# Patient Record
Sex: MALE | Race: WHITE
[De-identification: names, ages, dates, MRNs, and addresses within clinical notes are randomized per-mention and may not be internally consistent; named-entity substitution may affect disease eponyms.]

---

## 2018-08-13 ENCOUNTER — HOSPITAL ENCOUNTER (EMERGENCY)
Dept: HOSPITAL 50 - VM.ED | Age: 68
Discharge: HOME | End: 2018-08-13
Payer: MEDICARE

## 2018-08-13 VITALS — SYSTOLIC BLOOD PRESSURE: 182 MMHG | DIASTOLIC BLOOD PRESSURE: 94 MMHG

## 2018-08-13 DIAGNOSIS — Z79.82: ICD-10-CM

## 2018-08-13 DIAGNOSIS — S63.284A: ICD-10-CM

## 2018-08-13 DIAGNOSIS — S63.294A: ICD-10-CM

## 2018-08-13 DIAGNOSIS — Z79.899: ICD-10-CM

## 2018-08-13 DIAGNOSIS — Z79.84: ICD-10-CM

## 2018-08-13 DIAGNOSIS — F17.210: ICD-10-CM

## 2018-08-13 DIAGNOSIS — S06.0X9A: Primary | ICD-10-CM

## 2018-08-13 DIAGNOSIS — S01.01XA: ICD-10-CM
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
## 2018-08-14 NOTE — EDM.PDOC
ED HPI GENERAL MEDICAL PROBLEM





- General


Chief Complaint: Head Injury


Stated Complaint: FALL


Time Seen by Provider: 08/13/18 13:20


Source of Information: Reports: Patient


History Limitations: Reports: No Limitations





- History of Present Illness


INITIAL COMMENTS - FREE TEXT/NARRATIVE: 





Pt. presents to ER with complaints of finger pain, facial laceration, and neck 

pain post fall. States that it was a mechanical fall that was caused by 

tripping on a rub. He did strike the R side of his head and possibly had a 

brief LOC. No seizure activity or incontinence. States that he did not have any 

chest pain or shortness of breath prior to or after the fall. No nausea/

vomiting. Denies any blurred vision.


He complaints of pain to the 4th digit of the R hand.


Location: Reports: Head, Neck, Lower Extremity, Right


  ** Right 4-Ring finger


Pain Score (Numeric/FACES): 4





- Related Data


 Allergies











Allergy/AdvReac Type Severity Reaction Status Date / Time


 


No Known Allergies Allergy   Verified 08/13/18 14:48











Home Meds: 


 Home Meds





Allopurinol 150 mg PO DAILY 12/28/13 [History]


Saw Palmetto Fruit [Saw Palmetto] 450 mg PO DAILY 12/30/13 [History]


Albuterol/Ipratropium [Combivent] 1 puff INH QID #1 mdi 12/31/13 [Rx]


Aspirin 1 tab PO DAILY 09/15/15 [History]


Calcium Carbonate/Vitamin D3 [Calcium 600 + Vit D Tablet] 1 tab PO DAILY 09/15/

15 [History]


Clopidogrel [Plavix] 75 mg PO DAILY 09/15/15 [History]


Desoximetasone [Topicort 0.25% Crm] TOP ASDIRECTED PRN 09/15/15 [History]


Fish Oil/Omega-3 Fatty Acids [Fish Oil 1,000 MG] 1 cap PO DAILY 09/15/15 [

History]


Fluticasone/Salmeterol [Advair 250-50] 1 puff PO BID 09/15/15 [History]


Lisinopril 2.5 mg PO DAILY 09/15/15 [History]


Metoprolol Tartrate [Lopressor] 50 mg PO BID 09/15/15 [History]


Multivitamin [Multivitamins] 1 tab PO DAILY 09/15/15 [History]


Nitroglycerin [Nitrostat] 1 tab SL ASDIRECTED PRN 09/15/15 [History]


Sildenafil [Viagra] 100 mg PO BEDTIME PRN 09/15/15 [History]


atorvaSTATin [Lipitor] 80 mg PO BEDTIME 09/15/15 [History]


metFORMIN [Glucophage] 500 mg PO BIDMEALS 09/15/15 [History]


metroNIDAZOLE [Metrocream] 1 applic TP DAILY 09/15/15 [History]


Iron,Carbonyl/Vit C/Vit B12/Fa [Iron 100 Plus Tablet] 1 each PO DAILY 09/27/15 [

History]











Social & Family History





- Tobacco Use


Smoking Status *Q: Current Every Day Smoker


Years of Tobacco use: 50


Packs/Tins Daily: 1





- Alcohol Use


Days Per Week of Alcohol Use: 7


Number of Drinks Per Day: 4


Total Drinks Per Week: 28





- Recreational Drug Use


Recreational Drug Use: No





ED ROS GENERAL





- Review of Systems


Review Of Systems: See Below


Constitutional: Reports: No Symptoms


HEENT: Reports: Other (R temporal laceration)


Respiratory: Reports: No Symptoms


Cardiovascular: Reports: No Symptoms


Endocrine: Reports: No Symptoms


GI/Abdominal: Reports: No Symptoms


: Reports: No Symptoms


Musculoskeletal: Reports: Hand Pain (see hpi)


Skin: Reports: No Symptoms


Neurological: Reports: No Symptoms


Psychiatric: Reports: No Symptoms


Hematologic/Lymphatic: Reports: No Symptoms


Immunologic: Reports: No Symptoms





ED EXAM, GENERAL





- Physical Exam


Exam: See Below


Exam Limited By: No Limitations


General Appearance: Alert, WD/WN, No Apparent Distress


Eye Exam: Bilateral Eye: EOMI, Normal Fundi, Normal Inspection, PERRL


Ears: Normal External Exam, Normal Canal, Hearing Grossly Normal, Normal TMs


Ear Exam: Bilateral Ear: Auricle Normal, Canal Normal, TM normal


Nose: Normal Inspection, Normal Mucosa, No Blood


Throat/Mouth: Normal Inspection, Normal Lips, Normal Teeth, Normal Gums, Normal 

Oropharynx, Normal Voice, No Airway Compromise


Head: Normocephalic, Other (small superficial laceration/contusion to R 

temporal area)


Neck: Tender Lateral, Tender Midline, Other (discomfort with flexion and 

extension of the cervical spine)


Respiratory/Chest: No Respiratory Distress, Lungs Clear, Normal Breath Sounds, 

No Accessory Muscle Use, Chest Non-Tender


Cardiovascular: Normal Peripheral Pulses, Regular Rate, Rhythm, No Edema, No 

Gallop, No JVD, No Murmur, No Rub


Peripheral Pulses: 3+: Radial (L), Radial (R), Dorsalis Pedis (L), Dorsalis 

Pedis (R)


GI/Abdominal: Normal Bowel Sounds, Soft, Non-Tender, No Organomegaly, No 

Distention, No Abnormal Bruit, No Mass, Pelvis Stable


 (Male) Exam: Deferred


Rectal (Males) Exam: Deferred


Back Exam: Normal Inspection, Vertebral Tenderness (discomfort to the cervical 

spine)


Extremities: Other (obvious deformity to DIP and PIP of 4th digit R hand)


Neurological: Alert, Oriented, CN II-XII Intact, Normal Cognition, Normal 

Reflexes, No Motor/Sensory Deficits


Psychiatric: Normal Affect, Normal Mood


Skin Exam: Warm, Dry, Intact, Normal Color, No Rash





ED GENERAL MEDICAL PROCEDURES





- Additional/Other Procedure(s)


Other (Free Text) Procedure(s): 





the laceration to the Pt R temporal area was cleansed with chlorhexidine and 

NS. Dermabond was used to close the laceration that measured approx. 1.5 cm.





Attention was given to the dislocated finger. The base of the finger was 

cleansed with chlorhexidine. A total of 3 ml. of 1% Lidocane was used to 

perform a digital block of the finger. The finger was subsequently reduced with 

traction. The PIP re-dislocated, but the final post reduction radiographs show 

good alignment.


Finger was placed in a baseball spint.





Course





- Vital Signs


Last Recorded V/S: 





 Last Vital Signs











Temp  36.4 C   08/13/18 13:06


 


Pulse  104 H  08/13/18 13:06


 


Resp  20   08/13/18 13:06


 


BP  182/94 H  08/13/18 13:06


 


Pulse Ox      














- Orders/Labs/Meds


Meds: 





Medications














Discontinued Medications














Generic Name Dose Route Start Last Admin





  Trade Name Nahunq  PRN Reason Stop Dose Admin


 


Lidocaine HCl  30 ml  08/13/18 14:00  08/13/18 14:08





  Xylocaine-Mpf 1%  INJECT  08/13/18 14:01  30 ml





  ONETIME ONE   Administration





     





     





     





     














- Radiology Interpretation


Free Text/Narrative:: 





CT brain and c-spine were obtained and were negative. x-rays of the 4th digit R 

hand reveal a dislocation of the DIP and PIP





Departure





- Departure


Time of Disposition: 15:15


Disposition: Home, Self-Care 01


Clinical Impression: 


 Concussion with less than 1 hour loss of consciousness, Scalp laceration, 

Finger dislocation








- Discharge Information


Instructions:  RICE for Routine Care of Injuries, Easy-to-Read, Head Injury, 

Adult, Finger or Thumb Dislocation, Easy-to-Read


Referrals: 


Ivan Escobar PA-C [Primary Care Provider] - 


Forms:  ED Department Discharge


Additional Instructions: 


Norco 5/325mg 1 every 6 hours as needed for pain.





Keep splint on at all times.





I will let you know about your follow-up appointment with hand surgery.





Return to ER if the finger dislocates again, if you have any headache or 

increased neck pain, lightheadedness, or weakness.





- Problem List Review


Problem List Initiated/Reviewed/Updated: Yes





- Assessment/Plan


Plan: 


[facility] was consulted. Dr. Eddy wants the patient seen on 

Wednesday in clinic which was facilitated.





Norco 5/325mg 1 every 6 hours as needed for pain.





Keep splint on at all times.





Hand surgery contacted pt. to set up appt.





Return to ER if the finger dislocates again, if you have any headache or 

increased neck pain, lightheadedness, or weakness.

## 2020-11-20 NOTE — EDM.PDOC
ED HPI GENERAL MEDICAL PROBLEM





- General


Chief Complaint: General


Stated Complaint: general, fall 


Time Seen by Provider: 11/20/20 20:55


Source of Information: Reports: Patient


History Limitations: Reports: No Limitations





- History of Present Illness


INITIAL COMMENTS - FREE TEXT/NARRATIVE: 


Patient is brought into the emergency department via EMS after sustaining a fall

at a local bar.  Patient states that he was trying to ambulate and ended up 

falling landing on his side.  He denies hitting his head or causing any injury 

however he was unable to get up.  He sat there for a while and ended up having 

bystanders called 911 to help to do a lift assist. Ambulance felt that it was 

advisable to have the patient evaluated in cleared at the emergency department. 

Currently patient states he has no concerns or complaints and denies any 

injuries.  Patient denies any chest pain, shortness of breath, dizziness, 

lightheadedness, blurred vision, numbness or tingling, neck pain, back pain, 

pelvic pain, generalized weakness, abdominal pain, genitourinary concerns or 

loss of bowel or bladder, or peripheral edema.  Dates that he does have a 

longstanding history of COPD and shortness of breath.  He states that his 

shortness of breath is ongoing and continuous.  He does not feel that his 

shortness of breath is anything new or increased/insignificance.  He said it has

been at its baseline.  He continues to use his inhaler multiple times a day on a

regular basis to help with his shortness of breath.  Patient states he also 

continues to smoke daily and has not cut back.





Onset: Sudden


Quality: Reports: Other


Severity: Mild


Improves with: Reports: None


Worsens with: Reports: None


Associated Symptoms: Reports: No Other Symptoms





- Related Data


                                    Allergies











Allergy/AdvReac Type Severity Reaction Status Date / Time


 


No Known Allergies Allergy   Verified 11/20/20 21:30











Home Meds: 


                                    Home Meds





allopurinoL [Allopurinol] 150 mg PO DAILY 12/28/13 [History]


Saw Palmetto Fruit [Saw Palmetto] 450 mg PO DAILY 12/30/13 [History]


Albuterol/Ipratropium [Combivent] 1 puff INH QID #1 mdi 12/31/13 [Rx]


Aspirin 1 tab PO DAILY 09/15/15 [History]


Calcium Carbonate/Vitamin D3 [Calcium 600 + Vit D Tablet] 1 tab PO DAILY 

09/15/15 [History]


Clopidogrel [Plavix] 75 mg PO DAILY 09/15/15 [History]


Desoximetasone [Topicort 0.25% Crm] TOP ASDIRECTED PRN 09/15/15 [History]


Fish Oil/Omega-3 Fatty Acids [Fish Oil 1,000 MG] 1 cap PO DAILY 09/15/15 

[History]


Fluticasone/Salmeterol [Advair 250-50] 1 puff PO BID 09/15/15 [History]


Lisinopril 2.5 mg PO DAILY 09/15/15 [History]


Metoprolol Tartrate [Lopressor] 50 mg PO BID 09/15/15 [History]


Multivitamin [Multivitamins] 1 tab PO DAILY 09/15/15 [History]


Nitroglycerin [Nitrostat] 1 tab SL ASDIRECTED PRN 09/15/15 [History]


Sildenafil [Viagra] 100 mg PO BEDTIME PRN 09/15/15 [History]


atorvaSTATin [Lipitor] 80 mg PO BEDTIME 09/15/15 [History]


metFORMIN [Glucophage] 500 mg PO BIDMEALS 09/15/15 [History]


metroNIDAZOLE [Metrocream] 1 applic TP DAILY 09/15/15 [History]


Iron,Carb/Vit C/Vit B12/Folic [Iron 100 Plus Tablet] 1 each PO DAILY 09/27/15 

[History]


Meclizine [Antivert] 25 mg PO Q6H PRN #15 tab 11/20/20 [Rx]











ED ROS GENERAL





- Review of Systems


Review Of Systems: See Below


Constitutional: Reports: No Symptoms


HEENT: Reports: No Symptoms


Respiratory: Reports: No Symptoms


Cardiovascular: Reports: No Symptoms


Endocrine: Reports: No Symptoms


GI/Abdominal: Reports: No Symptoms


: Reports: No Symptoms


Musculoskeletal: Reports: No Symptoms


Skin: Reports: No Symptoms


Neurological: Reports: No Symptoms


Psychiatric: Reports: No Symptoms


Hematologic/Lymphatic: Reports: No Symptoms


Immunologic: Reports: No Symptoms





ED EXAM, GENERAL





- Physical Exam


Exam: See Below


General Appearance: Alert, WD/WN, No Apparent Distress


Eye Exam: Bilateral Eye: Nystagmus, PERRL


Ears: Normal External Exam, Normal Canal, Hearing Grossly Normal


Throat/Mouth: Normal Inspection, Normal Lips, Normal Teeth, No Airway Compromise


Head: Atraumatic, Normocephalic


Neck: Normal Inspection, Supple, Non-Tender, Full Range of Motion


Respiratory/Chest: No Respiratory Distress, Lungs Clear, Normal Breath Sounds, 

No Accessory Muscle Use, Chest Non-Tender


Cardiovascular: Normal Peripheral Pulses, Regular Rate, Rhythm, No Edema


Back Exam: Normal Inspection, Full Range of Motion


Extremities: Normal Inspection, Normal Range of Motion, Non-Tender, Normal 

Capillary Refill


Neurological: Alert, Oriented, CN II-XII Intact


Psychiatric: Normal Affect, Normal Mood


Skin Exam: Warm, Dry, Intact, Normal Color





Course





- Vital Signs


Last Recorded V/S: 


                                Last Vital Signs











Temp  37.7 C   11/20/20 20:49


 


Pulse  92   11/20/20 20:49


 


Resp  20   11/20/20 20:49


 


BP  146/101 H  11/20/20 20:49


 


Pulse Ox  91 L  11/20/20 20:49














- Orders/Labs/Meds


Orders: 


                               Active Orders 24 hr











 Category Date Time Status


 


 Head wo Cont [CT] Stat Exams  11/20/20 22:08 Taken


 


 Sodium Chloride 0.9% [Saline Flush] Med  11/20/20 21:17 Active





 10 ml FLUSH ASDIRECTED PRN   


 


 Peripheral IV Insertion Adult [OM.PC] Stat Oth  11/20/20 21:17 Ordered








                                Medication Orders





Sodium Chloride (Saline Flush)  10 ml FLUSH ASDIRECTED PRN


   PRN Reason: Keep Vein Open








Labs: 


                                Laboratory Tests











  11/20/20 11/20/20 11/20/20 Range/Units





  21:27 21:27 22:41 


 


WBC  4.9    (4.0-10.0)  x10^3/uL


 


RBC  3.34 L    (4.5-6.0)  x10^6/uL


 


Hgb  11.1 L D    (14.0-18.0)  g/dL


 


Hct  33.3 L    (40.0-52.0)  %


 


MCV  99.7 H D    (78.0-93.0)  fL


 


MCH  33.2 H    (26.0-32.0)  pg


 


MCHC  33.3    (32.0-36.0)  g/dL


 


RDW Coeff of Roger  12.8    (10.0-15.0)  %


 


Plt Count  240    (130-400)  x10^3/uL


 


Add Manual Diff  Yes    


 


Neutrophils % (Manual)  75    (50-80)  %


 


Lymphocytes % (Manual)  13 L    (25-50)  %


 


Atypical Lymphs %  3 H    (0)  %


 


Monocytes % (Manual)  9    (2-11)  %


 


Platelet Estimate  Adequate    


 


Sodium   133 L   (136-145)  mmol/L


 


Potassium   3.6   (3.5-5.1)  mmol/L


 


Chloride   99   ()  mmol/L


 


Carbon Dioxide   21   (21-32)  mmol/L


 


Anion Gap   16.6   (10-20)  mmol/L


 


BUN   32 H   (7-18)  mg/dL


 


Creatinine   2.4 H D   (0.70-1.30)  mg/dL


 


Est Cr Clr Drug Dosing   28.98   mL/min


 


Estimated GFR (MDRD)   27   


 


Glucose   100   ()  mg/dL


 


Calcium   8.5   (8.5-10.1)  mg/dL


 


Corrected Calcium   9.46   (8.5-10.1)  mg/dL


 


Total Bilirubin   0.3   (0.2-1.0)  mg/dL


 


AST   25   (15-37)  U/L


 


ALT   28   (16-63)  U/L


 


Alkaline Phosphatase   85   ()  U/L


 


Total Protein   7.5   (6.4-8.2)  g/dL


 


Albumin   2.8 L   (3.4-5.0)  g/dL


 


Globulin   4.7   


 


Albumin/Globulin Ratio   0.60   


 


Urine Color    Yellow  (YELLOW)  


 


Urine Appearance    Slightly cloudy H  (CLEAR)  


 


Urine pH    6.0  (5.0-8.0)  


 


Ur Specific Gravity    1.020  


 


Urine Protein    >=300 H  (NEGATIVE)  mg/dL


 


Urine Glucose (UA)    Negative  (NEGATIVE)  mg/dL


 


Urine Ketones    Trace H  (NEGATIVE)  mg/dL


 


Urine Occult Blood    Moderate H  (NEGATIVE)  


 


Urine Nitrite    Negative  (NEGATIVE)  


 


Urine Bilirubin    Small H  (NEGATIVE)  


 


Urine Urobilinogen    0.2  (0.2)  EU/dL


 


Ur Leukocyte Esterase    Negative  (NEGATIVE)  


 


U Hyaline Cast (Auto)    Few  


 


Urine RBC    10-20 H  (NOT SEEN)  /HPF


 


Urine WBC    0-5  (NOT SEEN)  /HPF


 


Ur Squamous Epith Cells    Not seen  (NEGATIVE)  /HPF


 


Urine Bacteria    Rare  (NEGATIVE)  /HPF


 


Urine Mucus    Few H  (NEGATIVE)  /LPF


 


Urine Opiates Screen     (NEGATIVE)  


 


Ur Buprenorphine Scrn     (NEGATIVE)  


 


Ur Oxycodone Screen     (NEGATIVE)  


 


Ur EDDP (Meth Metab)     (NEGATIVE)  


 


Urine Methadone Screen     (NEGATIVE)  


 


Ur Barbituates Screen     (NEGATIVE)  


 


Ur Tricyclics Screen     (NEGATIVE)  


 


Ur Phencyclidine Scrn     (NEGATIVE)  


 


Ur Amphetamines Screen     (NEGATIVE)  


 


U Methamphetamines Scrn     (NEGATIVE)  


 


Urine MDMA Screen     (NEGATIVE)  


 


U Benzodiazepines Scrn     (NEGATIVE)  


 


Urine Cocaine Screen     (NEGATIVE)  


 


U Marijuana (THC) Screen     (NEGATIVE)  


 


Ethyl Alcohol   3   (0-3)  mg/dL














  11/20/20 Range/Units





  22:41 


 


WBC   (4.0-10.0)  x10^3/uL


 


RBC   (4.5-6.0)  x10^6/uL


 


Hgb   (14.0-18.0)  g/dL


 


Hct   (40.0-52.0)  %


 


MCV   (78.0-93.0)  fL


 


MCH   (26.0-32.0)  pg


 


MCHC   (32.0-36.0)  g/dL


 


RDW Coeff of Roger   (10.0-15.0)  %


 


Plt Count   (130-400)  x10^3/uL


 


Add Manual Diff   


 


Neutrophils % (Manual)   (50-80)  %


 


Lymphocytes % (Manual)   (25-50)  %


 


Atypical Lymphs %   (0)  %


 


Monocytes % (Manual)   (2-11)  %


 


Platelet Estimate   


 


Sodium   (136-145)  mmol/L


 


Potassium   (3.5-5.1)  mmol/L


 


Chloride   ()  mmol/L


 


Carbon Dioxide   (21-32)  mmol/L


 


Anion Gap   (10-20)  mmol/L


 


BUN   (7-18)  mg/dL


 


Creatinine   (0.70-1.30)  mg/dL


 


Est Cr Clr Drug Dosing   mL/min


 


Estimated GFR (MDRD)   


 


Glucose   ()  mg/dL


 


Calcium   (8.5-10.1)  mg/dL


 


Corrected Calcium   (8.5-10.1)  mg/dL


 


Total Bilirubin   (0.2-1.0)  mg/dL


 


AST   (15-37)  U/L


 


ALT   (16-63)  U/L


 


Alkaline Phosphatase   ()  U/L


 


Total Protein   (6.4-8.2)  g/dL


 


Albumin   (3.4-5.0)  g/dL


 


Globulin   


 


Albumin/Globulin Ratio   


 


Urine Color   (YELLOW)  


 


Urine Appearance   (CLEAR)  


 


Urine pH   (5.0-8.0)  


 


Ur Specific Gravity   


 


Urine Protein   (NEGATIVE)  mg/dL


 


Urine Glucose (UA)   (NEGATIVE)  mg/dL


 


Urine Ketones   (NEGATIVE)  mg/dL


 


Urine Occult Blood   (NEGATIVE)  


 


Urine Nitrite   (NEGATIVE)  


 


Urine Bilirubin   (NEGATIVE)  


 


Urine Urobilinogen   (0.2)  EU/dL


 


Ur Leukocyte Esterase   (NEGATIVE)  


 


U Hyaline Cast (Auto)   


 


Urine RBC   (NOT SEEN)  /HPF


 


Urine WBC   (NOT SEEN)  /HPF


 


Ur Squamous Epith Cells   (NEGATIVE)  /HPF


 


Urine Bacteria   (NEGATIVE)  /HPF


 


Urine Mucus   (NEGATIVE)  /LPF


 


Urine Opiates Screen  Negative  (NEGATIVE)  


 


Ur Buprenorphine Scrn  Negative  (NEGATIVE)  


 


Ur Oxycodone Screen  Negative  (NEGATIVE)  


 


Ur EDDP (Meth Metab)  Negative  (NEGATIVE)  


 


Urine Methadone Screen  Negative  (NEGATIVE)  


 


Ur Barbituates Screen  Negative  (NEGATIVE)  


 


Ur Tricyclics Screen  Negative  (NEGATIVE)  


 


Ur Phencyclidine Scrn  Negative  (NEGATIVE)  


 


Ur Amphetamines Screen  Negative  (NEGATIVE)  


 


U Methamphetamines Scrn  Negative  (NEGATIVE)  


 


Urine MDMA Screen  Negative  (NEGATIVE)  


 


U Benzodiazepines Scrn  Negative  (NEGATIVE)  


 


Urine Cocaine Screen  Negative  (NEGATIVE)  


 


U Marijuana (THC) Screen  Negative  (NEGATIVE)  


 


Ethyl Alcohol   (0-3)  mg/dL











Meds: 


Medications











Generic Name Dose Route Start Last Admin





  Trade Name Freq  PRN Reason Stop Dose Admin


 


Sodium Chloride  10 ml  11/20/20 21:17 





  Saline Flush  FLUSH  





  ASDIRECTED PRN  





  Keep Vein Open  














Discontinued Medications














Generic Name Dose Route Start Last Admin





  Trade Name Freq  PRN Reason Stop Dose Admin


 


Sodium Chloride  1,000 mls @ 1,000 mls/hr  11/20/20 21:18 





  Normal Saline  IV  11/20/20 22:17 





  ONETIME ONE  














- Re-Assessments/Exams


Free Text/Narrative Re-Assessment/Exam: 


Try to ambulate the patient in the emergency department and very unsteady on his

 feet.  Does Prior summary to stand next to him for safety.  Did contact his 

next of kin listed on his contact information Mr. Armando Valenzuela Who is his 

brother.  He states that he has had gait disturbance for the last many years 

that he can remember.  He states that he goes down to the bar on Fridays and has

 between 1 and 3 drinks with local friends but does not drink heavily or have 

any other major concerns.


11/20/20 22:17





Free Text/Narrative Re-Assessment/Exam: 





11/20/20 23:27


After CT scan the patient states he has noticed when he gets up from the seated 

or laying position lately is when he notices the dizzy spells. he did not 

disclose that information previous to this time. 





Patient remained alert and oriented. VSS. Gait is unsteady but is able to 

ambulate without assistance but koenig recommend someone to be near incase if he 

becomes more unsteady or is dizzy. Brother Gurwinder contacted on phone and states He

 has noticed that his brother has become more unsteady the last 1 to 2 weeks.  

He states that his cognition has not faltered but his gait disturbance has been 

more prevalent.  Patient lives alone and does not have anyone checking on him 

regularly.  His brother states that he will help his brother through the weekend

 until he can be reevaluated again on Monday to ensure he is progressing in the 

positive manner if not look at further imaging or medical treatment options if 

necessary if the gait disturbance does not improve.





Departure





- Departure


Time of Disposition: 23:20


Disposition: Home, Self-Care 01


Condition: Good


Clinical Impression: 


 Vertigo, Unsteady gait





Fall


Qualifiers:


 Encounter type: initial encounter Qualified Code(s): W19.XXXA - Unspecified f

all, initial encounter








- Discharge Information


*PRESCRIPTION DRUG MONITORING PROGRAM REVIEWED*: Not Applicable


*COPY OF PRESCRIPTION DRUG MONITORING REPORT IN PATIENT GISELLE: Not Applicable


Instructions:  Fall Prevention in the Home, Adult, Easy-to-Read, Vertigo, 

Easy-to-Read, Meclizine tablets or capsules


Referrals: 


Ivan Escobar PA-C [Primary Care Provider] - 


Forms:  ED Department Discharge


Additional Instructions: 


1. rest


2. increase your water intake 


3. Continue all at home medications


4. Activity and diet as tolerated 


5. Can take over the counter Tylenol for any pain or discomfort 


6. Follow up with PCP if symptoms continue, return, or progress 


7. Call with any questions or concerns 


8. Recommendations are to be re-evaluated in the Clinic on Monday 


9. CT scan completed tonight did not show any major changes or bleeding, labs 

negative, and urine did not show an infection 


10. When standing up make sure you are getting up slowly and taking your time. 


 





Sepsis Event Note (ED)





- Focused Exam


Vital Signs: 


                                   Vital Signs











  Temp Pulse Resp BP Pulse Ox


 


 11/20/20 20:49  37.7 C  92  20  146/101 H  91 L














- My Orders


Last 24 Hours: 


My Active Orders





11/20/20 21:17


Sodium Chloride 0.9% [Saline Flush]   10 ml FLUSH ASDIRECTED PRN 


Peripheral IV Insertion Adult [OM.PC] Stat 





11/20/20 22:08


Head wo Cont [CT] Stat 














- Assessment/Plan


Last 24 Hours: 


My Active Orders





11/20/20 21:17


Sodium Chloride 0.9% [Saline Flush]   10 ml FLUSH ASDIRECTED PRN 


Peripheral IV Insertion Adult [OM.PC] Stat 





11/20/20 22:08


Head wo Cont [CT] Stat 











Assessment:: 





1. fall 


2. unsteady gait 


Plan: 


1.  Labs completed in  the ER.  Results reviewed with the patient


2.  IV initiated in the emergency department


3.  IV fluids provided


4.  Labs completed in the ER 


5.  


6.  Patient and nursing staff was updated regarding the plan of care


7.  Education provided the patient regarding activity, diet, rest, 

over-the-counter medication modalities, and follow-up care was provided


8.  Patient and family are agreeable to the above plan of care


9. All questions and concerns were addressed with the patient and family prior 

to discharge

## 2020-11-21 NOTE — CT
______________________________________________________________________________   

  

[id number] CT/CT Head WO IV  

EXAM: CT Head WO IV  

   

 CLINICAL DATA: FALLS, UNSTEADY GAIT.  

   

 COMPARISON STUDY: None  

   

 FINDINGS:  

   

 No intracranial hemorrhage, extra-axial fluid collection, mass, or acute  

 ischemia.   

   

 Generalized parenchymal atrophy with scattered areas of nonspecific white matter  

 disease, commonly seen as sequela of chronic microvascular ischemia.  

   

 Soft tissues are unremarkable. Mild to moderate mucosal thickening without  

 evidence of aggressive sinusitis. The mastoid air cells are well-aerated and  

 clear.  

    

 IMPRESSION:  

   

 No acute intracranial findings.  

   

 Electronically signed by Armando Arboleda MD on 11/21/2020 10:01 AM  

   

  

Armando Arboleda DO                 

 11/21/20 1004    

  

Thank you for allowing us to participate in the care of your patient.